# Patient Record
Sex: FEMALE | Race: WHITE | Employment: OTHER | URBAN - METROPOLITAN AREA
[De-identification: names, ages, dates, MRNs, and addresses within clinical notes are randomized per-mention and may not be internally consistent; named-entity substitution may affect disease eponyms.]

---

## 2017-05-07 ENCOUNTER — APPOINTMENT (OUTPATIENT)
Dept: GENERAL RADIOLOGY | Facility: HOSPITAL | Age: 62
End: 2017-05-07
Attending: EMERGENCY MEDICINE
Payer: COMMERCIAL

## 2017-05-07 ENCOUNTER — APPOINTMENT (OUTPATIENT)
Dept: GENERAL RADIOLOGY | Facility: HOSPITAL | Age: 62
End: 2017-05-07
Payer: COMMERCIAL

## 2017-05-07 ENCOUNTER — HOSPITAL ENCOUNTER (EMERGENCY)
Facility: HOSPITAL | Age: 62
Discharge: HOME | End: 2017-05-07
Attending: EMERGENCY MEDICINE
Payer: COMMERCIAL

## 2017-05-07 VITALS
HEIGHT: 66 IN | WEIGHT: 180 LBS | DIASTOLIC BLOOD PRESSURE: 63 MMHG | RESPIRATION RATE: 18 BRPM | HEART RATE: 75 BPM | BODY MASS INDEX: 28.93 KG/M2 | OXYGEN SATURATION: 98 % | SYSTOLIC BLOOD PRESSURE: 128 MMHG | TEMPERATURE: 98 F

## 2017-05-07 DIAGNOSIS — S20.211A CHEST WALL CONTUSION, RIGHT, INITIAL ENCOUNTER: ICD-10-CM

## 2017-05-07 DIAGNOSIS — S30.0XXA CONTUSION OF PELVIS, INITIAL ENCOUNTER: Primary | ICD-10-CM

## 2017-05-07 PROCEDURE — 71101 X-RAY EXAM UNILAT RIBS/CHEST: CPT | Performed by: EMERGENCY MEDICINE

## 2017-05-07 PROCEDURE — 99284 EMERGENCY DEPT VISIT MOD MDM: CPT

## 2017-05-07 PROCEDURE — 73502 X-RAY EXAM HIP UNI 2-3 VIEWS: CPT

## 2017-05-07 RX ORDER — IBUPROFEN 600 MG/1
600 TABLET ORAL ONCE
Status: COMPLETED | OUTPATIENT
Start: 2017-05-07 | End: 2017-05-07

## 2017-05-07 NOTE — ED INITIAL ASSESSMENT (HPI)
Pt reports trip/fall forward apptx 1 hour ago, landed on outstretched hands and left hip. Has abrasions to hands, pain to left hip.

## 2017-05-07 NOTE — ED PROVIDER NOTES
Patient Seen in: [facility] Emergency Department    History   Patient presents with:  Fall (musculoskeletal, neurologic)    Stated Complaint: fall, left hip pain    HPI    Patient presents with pain after fall.   She was walking downtown she trippe complaint: fall, left hip pain  Other systems are as noted in HPI. Constitutional and vital signs reviewed. All other systems reviewed and negative except as noted above.     PSFH elements reviewed from today and agreed except as otherwise stated in H want any prescriptions    ED Course   Labs Reviewed - No data to display     MDM     97% Normal       Disposition and Plan     Clinical Impression:  Contusion of pelvis, initial encounter  (primary encounter diagnosis)  Chest wall contusion, right, initial

## 2017-05-16 ENCOUNTER — OFFICE VISIT (OUTPATIENT)
Dept: FAMILY MEDICINE CLINIC | Facility: CLINIC | Age: 62
End: 2017-05-16

## 2017-05-16 VITALS
SYSTOLIC BLOOD PRESSURE: 119 MMHG | TEMPERATURE: 99 F | BODY MASS INDEX: 30.53 KG/M2 | WEIGHT: 190 LBS | HEIGHT: 66 IN | HEART RATE: 73 BPM | DIASTOLIC BLOOD PRESSURE: 70 MMHG

## 2017-05-16 DIAGNOSIS — M25.552 LEFT HIP PAIN: ICD-10-CM

## 2017-05-16 DIAGNOSIS — M79.18 PIRIFORMIS MUSCLE PAIN: ICD-10-CM

## 2017-05-16 DIAGNOSIS — M79.18 LEFT BUTTOCK PAIN: Primary | ICD-10-CM

## 2017-05-16 PROCEDURE — 99212 OFFICE O/P EST SF 10 MIN: CPT | Performed by: FAMILY MEDICINE

## 2017-05-16 PROCEDURE — 99214 OFFICE O/P EST MOD 30 MIN: CPT | Performed by: FAMILY MEDICINE

## 2017-05-16 NOTE — PROGRESS NOTES
Patient ID: Vladimir Nuñez is a 59year old female.       Patient Information      Patient Name Sex CHARLES Wen Female 6/3/1955       ED Provider Notes by Elizabeth Mackey MD at 2017  1:00 AM      Author: Elizabeth Mackey MD Ser Xr Hip W Or Wo Pelvis 2 Or 3 Views, Left (cpt=73502)    5/7/2017  PROCEDURE: XR HIP W OR WO PELVIS 2 OR 3 VIEWS, LEFT (CPT=73502)  COMPARISON: None. INDICATIONS: Left hip pain post fall today. TECHNIQUE: Three views were obtained.   FINDINGS:  BON tissue removal,endo frontal,endo total ethmoidectomy             Current Outpatient Prescriptions:  Fluticasone Propionate 50 MCG/ACT Nasal Suspension 2 sprays by Nasal route daily.  Disp: 1 Bottle Rfl: 11   acetaminophen 500 MG Oral Tab Take 500 mg by mout

## 2017-05-23 ENCOUNTER — OFFICE VISIT (OUTPATIENT)
Dept: PHYSICAL THERAPY | Age: 62
End: 2017-05-23
Attending: FAMILY MEDICINE
Payer: COMMERCIAL

## 2017-05-23 DIAGNOSIS — M79.18 PIRIFORMIS MUSCLE PAIN: ICD-10-CM

## 2017-05-23 DIAGNOSIS — M25.552 LEFT HIP PAIN: ICD-10-CM

## 2017-05-23 DIAGNOSIS — M79.18 LEFT BUTTOCK PAIN: Primary | ICD-10-CM

## 2017-05-23 PROCEDURE — 97162 PT EVAL MOD COMPLEX 30 MIN: CPT

## 2017-05-23 PROCEDURE — 97110 THERAPEUTIC EXERCISES: CPT

## 2017-05-23 PROCEDURE — 97530 THERAPEUTIC ACTIVITIES: CPT

## 2017-05-23 NOTE — PROGRESS NOTES
PHYSICAL THERAPY  EVALUATION:   Referring Physician: Dr. Alexis Humphries  Date of Onset: 5/7/2017 Date of Service: 5/23/2017   Dx: Left buttock pain (M79.1)  Piriformis muscle pain (M79.1)  Left hip pain (M25.552)  PATIENT SUMMARY:   Johnna Young is a 59 [employer] following injury, and she will continue to benefit from therapeutic neuroscience education for pain education.  She presents with movement dysfunction consistent with insufficient L hip flexion and extension with AGMR (anterior glide medial rotat dermatomes intact to light touch    Accessory Motion (remarkable findings): decreased L hip posterior glide    Special Tests:  - Hip Scouring Pain: R=(-), L =(+)  - KRISTI test: R = (-), L =(+)     Today’s Treatment and Response: pt with increased L hip fle care.    Thank you for your referral. Please co-sign or sign and return this letter via fax as soon as possible to [phone]. If you have any questions, please contact me at No information on file.     Sincerely,  Electronically signed by therapist: Vicenta Burns

## 2017-05-25 ENCOUNTER — OFFICE VISIT (OUTPATIENT)
Dept: PHYSICAL THERAPY | Age: 62
End: 2017-05-25
Attending: FAMILY MEDICINE
Payer: COMMERCIAL

## 2017-05-25 DIAGNOSIS — M79.18 LEFT BUTTOCK PAIN: Primary | ICD-10-CM

## 2017-05-25 DIAGNOSIS — M79.18 PIRIFORMIS MUSCLE PAIN: ICD-10-CM

## 2017-05-25 DIAGNOSIS — M25.552 LEFT HIP PAIN: ICD-10-CM

## 2017-05-25 PROCEDURE — 97140 MANUAL THERAPY 1/> REGIONS: CPT

## 2017-05-25 PROCEDURE — 97530 THERAPEUTIC ACTIVITIES: CPT

## 2017-05-25 PROCEDURE — 97110 THERAPEUTIC EXERCISES: CPT

## 2017-05-25 NOTE — PROGRESS NOTES
Dx: Left buttock pain (M79.1)  Piriformis muscle pain (M79.1)  Left hip pain (M25.552)         Authorized # of Visits:  2/9 - 8/21/2017        Next MD visit: none scheduled  Fall Risk: standard         Precautions: n/a           Medication Changes since la progressive strengthening as pt tolerates.     Charges: Merlyn Foley Act x1       Total Timed Treatment: 45 min  Total Treatment Time: 45 min

## 2017-05-30 ENCOUNTER — OFFICE VISIT (OUTPATIENT)
Dept: PHYSICAL THERAPY | Age: 62
End: 2017-05-30
Attending: FAMILY MEDICINE
Payer: COMMERCIAL

## 2017-05-30 DIAGNOSIS — M25.552 LEFT HIP PAIN: ICD-10-CM

## 2017-05-30 DIAGNOSIS — M79.18 PIRIFORMIS MUSCLE PAIN: ICD-10-CM

## 2017-05-30 DIAGNOSIS — M79.18 LEFT BUTTOCK PAIN: Primary | ICD-10-CM

## 2017-05-30 PROCEDURE — 97110 THERAPEUTIC EXERCISES: CPT

## 2017-05-30 PROCEDURE — 97140 MANUAL THERAPY 1/> REGIONS: CPT

## 2017-05-30 NOTE — PROGRESS NOTES
Dx: Left buttock pain (M79.1)  Piriformis muscle pain (M79.1)  Left hip pain (M25.552)         Authorized # of Visits:  2/9 - 8/21/2017        Next MD visit: none scheduled  Fall Risk: standard         Precautions: n/a           Medication Changes since la better ease with stair climbing at home. (IN PROGRESS - Down alternating; improving to ascend)  3. Patient will have L hip strength at 5/5 to be able to get up and down from the floor.  (IN PROGRESS)  4. Patient will perform all functional transfers ( sit t

## 2017-06-01 ENCOUNTER — OFFICE VISIT (OUTPATIENT)
Dept: PHYSICAL THERAPY | Age: 62
End: 2017-06-01
Attending: FAMILY MEDICINE
Payer: COMMERCIAL

## 2017-06-01 DIAGNOSIS — M79.18 PIRIFORMIS MUSCLE PAIN: ICD-10-CM

## 2017-06-01 DIAGNOSIS — M25.552 LEFT HIP PAIN: ICD-10-CM

## 2017-06-01 DIAGNOSIS — M79.18 LEFT BUTTOCK PAIN: Primary | ICD-10-CM

## 2017-06-01 PROCEDURE — 97140 MANUAL THERAPY 1/> REGIONS: CPT

## 2017-06-01 PROCEDURE — 97110 THERAPEUTIC EXERCISES: CPT

## 2017-06-01 NOTE — PROGRESS NOTES
Dx: Left buttock pain (M79.1)  Piriformis muscle pain (M79.1)  Left hip pain (M25.552)         Authorized # of Visits:  2/9 - 8/21/2017        Next MD visit: none scheduled  Fall Risk: standard         Precautions: n/a           Medication Changes since la minutes  - 1/2 kneel floor to/from stand transfer (demo and pt return demo; 1 hand support at mat table)  - Sit to stand transfer; anterior weight shift; staggered feet               Assessment: L hip flexion is steadily improving; however, she shows poor

## 2017-06-08 ENCOUNTER — OFFICE VISIT (OUTPATIENT)
Dept: PHYSICAL THERAPY | Age: 62
End: 2017-06-08
Attending: FAMILY MEDICINE
Payer: COMMERCIAL

## 2017-06-08 DIAGNOSIS — M79.18 PIRIFORMIS MUSCLE PAIN: ICD-10-CM

## 2017-06-08 DIAGNOSIS — M79.18 LEFT BUTTOCK PAIN: Primary | ICD-10-CM

## 2017-06-08 DIAGNOSIS — M25.552 LEFT HIP PAIN: ICD-10-CM

## 2017-06-08 PROCEDURE — 97140 MANUAL THERAPY 1/> REGIONS: CPT

## 2017-06-08 PROCEDURE — 97110 THERAPEUTIC EXERCISES: CPT

## 2017-06-08 NOTE — PROGRESS NOTES
Dx: Left buttock pain (M79.1)  Piriformis muscle pain (M79.1)  Left hip pain (M25.552)         Authorized # of Visits:  2/9 - 8/21/2017        Next MD visit: none scheduled  Fall Risk: standard         Precautions: n/a           Medication Changes since la Sidelying: Lumbar sacral distraction X 15 minutes  - Supine: MFR at L iliopsoas; L hip adductors  - Supine: posterior mob of L femoral head; PROM for hip flex/ext  - Supine: Diaphragm  MFR X 15 minutes  - Supine: MFR at L iliopsoas; L hip adductors  - Supi

## 2017-06-09 ENCOUNTER — TELEPHONE (OUTPATIENT)
Dept: PHYSICAL THERAPY | Age: 62
End: 2017-06-09

## 2017-06-13 ENCOUNTER — APPOINTMENT (OUTPATIENT)
Dept: PHYSICAL THERAPY | Age: 62
End: 2017-06-13
Attending: FAMILY MEDICINE
Payer: COMMERCIAL

## 2017-06-20 ENCOUNTER — APPOINTMENT (OUTPATIENT)
Dept: PHYSICAL THERAPY | Age: 62
End: 2017-06-20
Attending: FAMILY MEDICINE
Payer: COMMERCIAL

## 2017-06-22 ENCOUNTER — OFFICE VISIT (OUTPATIENT)
Dept: PHYSICAL THERAPY | Age: 62
End: 2017-06-22
Attending: FAMILY MEDICINE
Payer: COMMERCIAL

## 2017-06-22 DIAGNOSIS — M25.552 LEFT HIP PAIN: ICD-10-CM

## 2017-06-22 DIAGNOSIS — M79.18 PIRIFORMIS MUSCLE PAIN: ICD-10-CM

## 2017-06-22 DIAGNOSIS — M79.18 LEFT BUTTOCK PAIN: Primary | ICD-10-CM

## 2017-06-22 PROCEDURE — 97110 THERAPEUTIC EXERCISES: CPT

## 2017-06-22 PROCEDURE — 97140 MANUAL THERAPY 1/> REGIONS: CPT

## 2017-06-22 NOTE — PROGRESS NOTES
Dx: Left buttock pain (M79.1)  Piriformis muscle pain (M79.1)  Left hip pain (M25.552)         Authorized # of Visits:  6/9 - 8/21/2017        Next MD visit: none scheduled  Fall Risk: standard         Precautions: n/a           Medication Changes since la Hip Ext  R=WFL, L=Does not extend to neutral R=WFL, L = mild loss (stiffness)      Palpation: mild tenderness to palpation at L iliopsoas; proximal hip adductors; TFL    Neuro Screen: B LE dermatomes intact to light touch    Accessory Motion (remarkable 10x2, B  - Supine: SLR; 10x2  - standing squats (\"sitbacks\"); 10x2  - Sidelying: benigno PGM  - Standing: forward lunges; 10xB  - standing: staggered squat; 10x1   Manual Therapy X 8 minutes  - Supine: MFR at L iliopsoas; L hip adductors  - Supine: po stiffness)  3. Patient will have L hip strength at 5/5 to be able to get up and down from the floor.  (IN PROGRESS)  4. Patient will perform all functional transfers ( sit to/from stand and car transfers) with good body mechanics and without provocation of

## 2017-06-27 ENCOUNTER — APPOINTMENT (OUTPATIENT)
Dept: PHYSICAL THERAPY | Age: 62
End: 2017-06-27
Payer: COMMERCIAL

## 2017-06-29 ENCOUNTER — OFFICE VISIT (OUTPATIENT)
Dept: PHYSICAL THERAPY | Age: 62
End: 2017-06-29
Attending: FAMILY MEDICINE
Payer: COMMERCIAL

## 2017-06-29 PROCEDURE — 97110 THERAPEUTIC EXERCISES: CPT

## 2017-06-29 PROCEDURE — 97530 THERAPEUTIC ACTIVITIES: CPT

## 2017-06-29 NOTE — PROGRESS NOTES
Patient Name: Luciana Sullivan  YOB: 1955          MRN number:  [id number]  Date:  6/30/2017  Referring Physician:  Elizabeth Back  Dx:  Left buttock pain (M79.1)  Piriformis muscle pain (M79.1)  Left hip pain (M25.552)         Authorized # of V L=2+/5   R=4+, L=4/5   Knee Extension  R=5/5, L=5/5   R=5/5, L=5/5   Knee Flexion  R=5/5, L=5/5   R=5/5, L=5/5   Ankle DF  R=5/5, L=5/5   R=5/5, L=5/5   Hip IR NT  R= 4-/5, L =4/5   Hip ER NT R= 4/5, L= 4-/5       LE ROM:    5/23/2017 6/22/2017    Hip Fle required: NO      Treatment Rendered   6/29/2017  Tx#: 7/9   HEP - Reviewed HEP and body mechanics for exercise at [facility]  - added sciatic n. neuromobilization   Therapeutic Exercise X 30 minutes  -objective measures taken, see above  - bridges; 10x1

## 2017-09-12 ENCOUNTER — TELEPHONE (OUTPATIENT)
Dept: FAMILY MEDICINE CLINIC | Facility: CLINIC | Age: 62
End: 2017-09-12

## 2017-09-12 DIAGNOSIS — Z12.31 SCREENING MAMMOGRAM, ENCOUNTER FOR: Primary | ICD-10-CM

## 2017-10-05 ENCOUNTER — HOSPITAL ENCOUNTER (OUTPATIENT)
Dept: GENERAL RADIOLOGY | Age: 62
Discharge: HOME | End: 2017-10-05
Attending: FAMILY MEDICINE | Admitting: FAMILY MEDICINE
Payer: COMMERCIAL

## 2017-10-05 ENCOUNTER — OFFICE VISIT (OUTPATIENT)
Dept: FAMILY MEDICINE CLINIC | Facility: CLINIC | Age: 62
End: 2017-10-05

## 2017-10-05 VITALS
TEMPERATURE: 97 F | HEIGHT: 66 IN | HEART RATE: 57 BPM | BODY MASS INDEX: 30.37 KG/M2 | WEIGHT: 189 LBS | SYSTOLIC BLOOD PRESSURE: 129 MMHG | DIASTOLIC BLOOD PRESSURE: 72 MMHG

## 2017-10-05 DIAGNOSIS — Z00.00 ADULT GENERAL MEDICAL EXAM: Primary | ICD-10-CM

## 2017-10-05 DIAGNOSIS — E66.09 NON MORBID OBESITY DUE TO EXCESS CALORIES: ICD-10-CM

## 2017-10-05 DIAGNOSIS — J30.89 OTHER ALLERGIC RHINITIS: ICD-10-CM

## 2017-10-05 DIAGNOSIS — R68.89 CHRONIC THROAT CLEARING: ICD-10-CM

## 2017-10-05 DIAGNOSIS — Z23 NEED FOR VACCINATION: ICD-10-CM

## 2017-10-05 DIAGNOSIS — Z12.4 CERVICAL CANCER SCREENING: ICD-10-CM

## 2017-10-05 DIAGNOSIS — Z12.11 SCREENING FOR COLON CANCER: ICD-10-CM

## 2017-10-05 PROCEDURE — 99396 PREV VISIT EST AGE 40-64: CPT | Performed by: FAMILY MEDICINE

## 2017-10-05 PROCEDURE — 99212 OFFICE O/P EST SF 10 MIN: CPT | Performed by: FAMILY MEDICINE

## 2017-10-05 PROCEDURE — 71020 XR CHEST PA + LAT CHEST (CPT=71020): CPT | Performed by: FAMILY MEDICINE

## 2017-10-05 PROCEDURE — 90715 TDAP VACCINE 7 YRS/> IM: CPT | Performed by: FAMILY MEDICINE

## 2017-10-05 PROCEDURE — 90471 IMMUNIZATION ADMIN: CPT | Performed by: FAMILY MEDICINE

## 2017-10-05 RX ORDER — MONTELUKAST SODIUM 10 MG/1
10 TABLET ORAL DAILY
Qty: 90 TABLET | Refills: 1 | Status: SHIPPED | OUTPATIENT
Start: 2017-10-05 | End: 2018-04-03

## 2017-10-05 NOTE — PROGRESS NOTES
Patient ID: Caty Terry is a 57year old female. HPI  Patient presents with:  Routine Physical  She is here for physical exam.  She has an order for mammogram from September 2017 but states she has not made the appointment yet.   She has never had speech difficulty and light-headedness. Hematological: Negative for adenopathy. Does not bruise/bleed easily. Psychiatric/Behavioral: Negative for dysphoric mood and hallucinations. The patient is not nervous/anxious.           Past Medical History:   D oriented to person, place, and time. Skin: Skin is warm. Psychiatric: has a normal mood and affect. Vitals reviewed.       Blood pressure 129/72, pulse 57, temperature (!) 97.1 °F (36.2 °C), temperature source Oral, height 5' 6\" (1.676 m), weight 189 Dipropionate (QNASL) 80 MCG/ACT Nasal Aero Soln; 2 sprays by Nasal route daily. Flonase is not helping. Less to Qnasl instead and start Singulair at bedtime  Chronic throat clearing  -     Montelukast Sodium (SINGULAIR) 10 MG Oral Tab;  Take 1 tablet (10

## 2018-10-15 ENCOUNTER — OFFICE VISIT (OUTPATIENT)
Dept: OTOLARYNGOLOGY | Facility: CLINIC | Age: 63
End: 2018-10-15
Payer: COMMERCIAL

## 2018-10-15 VITALS
BODY MASS INDEX: 29.41 KG/M2 | SYSTOLIC BLOOD PRESSURE: 120 MMHG | HEIGHT: 63 IN | DIASTOLIC BLOOD PRESSURE: 70 MMHG | WEIGHT: 166 LBS | TEMPERATURE: 99 F

## 2018-10-15 DIAGNOSIS — J32.4 CHRONIC PANSINUSITIS: Primary | ICD-10-CM

## 2018-10-15 PROCEDURE — 99214 OFFICE O/P EST MOD 30 MIN: CPT | Performed by: OTOLARYNGOLOGY

## 2018-10-15 PROCEDURE — 99212 OFFICE O/P EST SF 10 MIN: CPT | Performed by: OTOLARYNGOLOGY

## 2018-10-15 RX ORDER — AMOXICILLIN 500 MG/1
500 TABLET, FILM COATED ORAL 3 TIMES DAILY
Qty: 30 TABLET | Refills: 1 | Status: SHIPPED | OUTPATIENT
Start: 2018-10-15 | End: 2018-10-25

## 2018-10-15 NOTE — PROGRESS NOTES
Fredrick Smith is a 70year old female. Patient presents with:  Nose Problem: pt reports nasal congestion, drainage, sinus pressure, headaches, coughing      HISTORY OF PRESENT ILLNESS  3/2/16 She presents for right-sided ear pressure.   She was recentl that her nose is producing can actually drain out. She has tried several different elimination diets and feels that when she does alter her diet her symptoms could greatly improved.   She finds that some of the triggers might be season changes humidity and Problem Relation Age of Onset   • Diabetes Other         Family h/o Diabetes   • Hypertension Other         Family h/o Hypertension   • Arthritis Other         Family h/o Arthritis   • Dementia Father         Alzheimer's Disease; age 80 (cause of death) Nasopharynx Normal External nose - Normal. Lips/teeth/gums - Normal. Tonsils - Normal. Oropharynx - Normal.   Ears  Normal Inspection - Right: Normal, Left: Normal. Canal - Right: Normal, Left: Normal. TM nl au   Skin Normal Inspection - Normal.

## 2020-09-29 ENCOUNTER — OFFICE VISIT (OUTPATIENT)
Dept: FAMILY MEDICINE CLINIC | Facility: CLINIC | Age: 65
End: 2020-09-29
Payer: COMMERCIAL

## 2020-09-29 ENCOUNTER — HOSPITAL ENCOUNTER (OUTPATIENT)
Dept: GENERAL RADIOLOGY | Age: 65
Discharge: HOME | End: 2020-09-29
Attending: FAMILY MEDICINE
Payer: COMMERCIAL

## 2020-09-29 ENCOUNTER — LAB ENCOUNTER (OUTPATIENT)
Dept: LAB | Age: 65
End: 2020-09-29
Attending: FAMILY MEDICINE
Payer: COMMERCIAL

## 2020-09-29 VITALS
TEMPERATURE: 97 F | SYSTOLIC BLOOD PRESSURE: 124 MMHG | BODY MASS INDEX: 31.86 KG/M2 | HEIGHT: 63 IN | WEIGHT: 179.81 LBS | DIASTOLIC BLOOD PRESSURE: 77 MMHG | HEART RATE: 72 BPM

## 2020-09-29 DIAGNOSIS — R09.89 BILATERAL CAROTID BRUITS: ICD-10-CM

## 2020-09-29 DIAGNOSIS — Z11.59 NEED FOR HEPATITIS C SCREENING TEST: ICD-10-CM

## 2020-09-29 DIAGNOSIS — Z00.00 ADULT GENERAL MEDICAL EXAM: Primary | ICD-10-CM

## 2020-09-29 DIAGNOSIS — I35.8 AORTIC HEART MURMUR ON EXAMINATION: ICD-10-CM

## 2020-09-29 DIAGNOSIS — Z12.31 VISIT FOR SCREENING MAMMOGRAM: ICD-10-CM

## 2020-09-29 DIAGNOSIS — M76.30 ILIOTIBIAL BAND SYNDROME, UNSPECIFIED LATERALITY: ICD-10-CM

## 2020-09-29 DIAGNOSIS — Z12.4 CERVICAL CANCER SCREENING: ICD-10-CM

## 2020-09-29 DIAGNOSIS — Z78.0 MENOPAUSE: ICD-10-CM

## 2020-09-29 DIAGNOSIS — Z23 NEED FOR VACCINATION: ICD-10-CM

## 2020-09-29 DIAGNOSIS — Z12.11 SCREENING FOR COLON CANCER: ICD-10-CM

## 2020-09-29 DIAGNOSIS — E66.09 NON MORBID OBESITY DUE TO EXCESS CALORIES: ICD-10-CM

## 2020-09-29 DIAGNOSIS — G57.03 PIRIFORMIS SYNDROME OF BOTH SIDES: ICD-10-CM

## 2020-09-29 DIAGNOSIS — J30.89 OTHER ALLERGIC RHINITIS: ICD-10-CM

## 2020-09-29 PROCEDURE — 3008F BODY MASS INDEX DOCD: CPT | Performed by: FAMILY MEDICINE

## 2020-09-29 PROCEDURE — 71046 X-RAY EXAM CHEST 2 VIEWS: CPT | Performed by: FAMILY MEDICINE

## 2020-09-29 PROCEDURE — 93005 ELECTROCARDIOGRAM TRACING: CPT

## 2020-09-29 PROCEDURE — 3074F SYST BP LT 130 MM HG: CPT | Performed by: FAMILY MEDICINE

## 2020-09-29 PROCEDURE — 99214 OFFICE O/P EST MOD 30 MIN: CPT | Performed by: FAMILY MEDICINE

## 2020-09-29 PROCEDURE — 99397 PER PM REEVAL EST PAT 65+ YR: CPT | Performed by: FAMILY MEDICINE

## 2020-09-29 PROCEDURE — 90471 IMMUNIZATION ADMIN: CPT | Performed by: FAMILY MEDICINE

## 2020-09-29 PROCEDURE — 90670 PCV13 VACCINE IM: CPT | Performed by: FAMILY MEDICINE

## 2020-09-29 PROCEDURE — 93010 ELECTROCARDIOGRAM REPORT: CPT | Performed by: FAMILY MEDICINE

## 2020-09-29 PROCEDURE — 99212 OFFICE O/P EST SF 10 MIN: CPT | Performed by: FAMILY MEDICINE

## 2020-09-29 PROCEDURE — 3078F DIAST BP <80 MM HG: CPT | Performed by: FAMILY MEDICINE

## 2020-09-29 NOTE — PROGRESS NOTES
Patient ID: Austen Nicholas is a 77year old female. HPI  Patient presents with:  Physical    Pt presents for a physical but would also like to be seen for left hip pain as well as bilateral thigh pain. Last physical on 10/5/2017.      Pt is [employer] 295 06/01/2016     06/01/2016    K 4.0 06/01/2016     06/01/2016    CO2 31 06/01/2016       Lab Results   Component Value Date     (H) 06/01/2016    BUN 13 06/01/2016    CREATSERUM 0.94 06/01/2016    BUNCREA 13.8 06/01/2016    ANIONGAP 9 frontal,endo total ethmoidectomy    • TONSILLECTOMY         Social History    Socioeconomic History      Marital status:       Spouse name: Not on file      Number of children: Not on file      Years of education: Not on file      Highest education Seat Belt: Not Asked        Self-Exams: Not Asked    Social History Narrative      Not on file         No current outpatient medications on file.      Allergies:No Known Allergies   PHYSICAL EXAM:   Physical Exam    Physical Exam   Constitutional: Patient 179 lb 12.8 oz (81.6 kg), not currently breastfeeding.          ASSESSMENT/PLAN:     REFUSES FLU SHOT    Diagnoses and all orders for this visit:    Adult general medical exam  -     CBC WITH DIFFERENTIAL WITH PLATELET  -     COMP METABOLIC PANEL (14)  - Comments:              Gastroenterology Department phone number is [phone].  ++++ Ask for first available provider that can see you ++++.           Referral Priority:Routine          Referral Type:OFFICE VISIT          Requested Specialty:GASTROENTEROL

## 2020-09-30 ENCOUNTER — HOSPITAL ENCOUNTER (OUTPATIENT)
Dept: ULTRASOUND IMAGING | Facility: HOSPITAL | Age: 65
Discharge: HOME | End: 2020-09-30
Attending: FAMILY MEDICINE
Payer: COMMERCIAL

## 2020-09-30 DIAGNOSIS — R09.89 BILATERAL CAROTID BRUITS: ICD-10-CM

## 2020-09-30 PROCEDURE — 93880 EXTRACRANIAL BILAT STUDY: CPT | Performed by: FAMILY MEDICINE

## 2020-10-01 ENCOUNTER — HOSPITAL ENCOUNTER (OUTPATIENT)
Dept: CV DIAGNOSTICS | Facility: HOSPITAL | Age: 65
Discharge: HOME | End: 2020-10-01
Attending: FAMILY MEDICINE
Payer: COMMERCIAL

## 2020-10-01 DIAGNOSIS — I35.8 AORTIC HEART MURMUR ON EXAMINATION: ICD-10-CM

## 2020-10-01 PROCEDURE — 93306 TTE W/DOPPLER COMPLETE: CPT | Performed by: FAMILY MEDICINE

## 2020-10-23 ENCOUNTER — HOSPITAL ENCOUNTER (OUTPATIENT)
Dept: BONE DENSITY | Facility: HOSPITAL | Age: 65
Discharge: HOME | End: 2020-10-23
Attending: FAMILY MEDICINE
Payer: COMMERCIAL

## 2020-10-23 DIAGNOSIS — Z78.0 MENOPAUSE: ICD-10-CM

## 2020-10-23 PROCEDURE — 77080 DXA BONE DENSITY AXIAL: CPT | Performed by: FAMILY MEDICINE

## 2020-10-26 ENCOUNTER — HOSPITAL ENCOUNTER (OUTPATIENT)
Dept: MAMMOGRAPHY | Facility: HOSPITAL | Age: 65
Discharge: HOME | End: 2020-10-26
Attending: FAMILY MEDICINE
Payer: COMMERCIAL

## 2020-10-26 DIAGNOSIS — Z12.31 VISIT FOR SCREENING MAMMOGRAM: ICD-10-CM

## 2020-10-26 PROCEDURE — 77063 BREAST TOMOSYNTHESIS BI: CPT | Performed by: FAMILY MEDICINE

## 2020-10-26 PROCEDURE — 77067 SCR MAMMO BI INCL CAD: CPT | Performed by: FAMILY MEDICINE

## 2020-10-29 ENCOUNTER — PATIENT MESSAGE (OUTPATIENT)
Dept: FAMILY MEDICINE CLINIC | Facility: CLINIC | Age: 65
End: 2020-10-29

## 2020-10-30 NOTE — TELEPHONE ENCOUNTER
To: Adalgisa Hermosillo      From: Rabia Thomas RN      Created: 10/30/2020 7:55 AM        Sangeetha Lofton,  I apologize for the phone #. See below for the referral information and phone #.  Thank you, Shauna NGUYEN     Referral Information:  Order Date: Oct 1, 2020

## 2020-10-30 NOTE — TELEPHONE ENCOUNTER
From: Marilyn Govea  To: Stephane Silver DO  Sent: 10/29/2020 4:09 PM CDT  Subject: Non-Urgent Medical Question    In notes from echocardiogram you noted a referral to see a cardiologist so they can evaluate me.  The cardiology department phone number (3

## 2020-11-17 ENCOUNTER — MED REC SCAN ONLY (OUTPATIENT)
Dept: FAMILY MEDICINE CLINIC | Facility: CLINIC | Age: 65
End: 2020-11-17

## 2020-12-15 ENCOUNTER — OFFICE VISIT (OUTPATIENT)
Dept: FAMILY MEDICINE CLINIC | Facility: CLINIC | Age: 65
End: 2020-12-15
Payer: COMMERCIAL

## 2020-12-15 ENCOUNTER — HOSPITAL ENCOUNTER (OUTPATIENT)
Dept: ULTRASOUND IMAGING | Facility: HOSPITAL | Age: 65
Discharge: HOME | End: 2020-12-15
Attending: FAMILY MEDICINE
Payer: COMMERCIAL

## 2020-12-15 ENCOUNTER — NURSE TRIAGE (OUTPATIENT)
Dept: FAMILY MEDICINE CLINIC | Facility: CLINIC | Age: 65
End: 2020-12-15

## 2020-12-15 VITALS
BODY MASS INDEX: 32.6 KG/M2 | DIASTOLIC BLOOD PRESSURE: 73 MMHG | WEIGHT: 184 LBS | HEART RATE: 56 BPM | SYSTOLIC BLOOD PRESSURE: 124 MMHG | HEIGHT: 63 IN

## 2020-12-15 DIAGNOSIS — M25.562 ACUTE PAIN OF LEFT KNEE: ICD-10-CM

## 2020-12-15 DIAGNOSIS — M79.662 PAIN OF LEFT LOWER LEG: ICD-10-CM

## 2020-12-15 DIAGNOSIS — M79.662 PAIN OF LEFT LOWER LEG: Primary | ICD-10-CM

## 2020-12-15 PROCEDURE — 99212 OFFICE O/P EST SF 10 MIN: CPT | Performed by: FAMILY MEDICINE

## 2020-12-15 PROCEDURE — 3074F SYST BP LT 130 MM HG: CPT | Performed by: FAMILY MEDICINE

## 2020-12-15 PROCEDURE — 93971 EXTREMITY STUDY: CPT | Performed by: FAMILY MEDICINE

## 2020-12-15 PROCEDURE — 3008F BODY MASS INDEX DOCD: CPT | Performed by: FAMILY MEDICINE

## 2020-12-15 PROCEDURE — 99214 OFFICE O/P EST MOD 30 MIN: CPT | Performed by: FAMILY MEDICINE

## 2020-12-15 PROCEDURE — 3078F DIAST BP <80 MM HG: CPT | Performed by: FAMILY MEDICINE

## 2020-12-15 NOTE — TELEPHONE ENCOUNTER
----- Message from Ayala Tobias sent at 12/15/2020  8:35 AM CST -----  Regarding: RE: Visit Follow-up Question  Contact: [phone]  The condition in my left leg has gotten worse - I would like to see a specialist.  The PT I have received has not cleare

## 2020-12-15 NOTE — PROGRESS NOTES
HPI:    Patient ID: Fay Kirkland is a 77year old female. HPI  Pt has been experiencing left knee pain. Worse with stairs. Currently involved in PT. Now feels pain in left lower leg and calf. No injury. Some swelling.    Review of Systems   Cons

## 2020-12-16 ENCOUNTER — PATIENT MESSAGE (OUTPATIENT)
Dept: FAMILY MEDICINE CLINIC | Facility: CLINIC | Age: 65
End: 2020-12-16

## 2020-12-16 DIAGNOSIS — M79.605 LEFT LEG PAIN: Primary | ICD-10-CM

## 2020-12-16 NOTE — TELEPHONE ENCOUNTER
From: Harvinder Sauceda  To: Jeremias Wetzel DO  Sent: 12/16/2020 10:30 AM CST  Subject: Test Results Question    Since the ultrasound on my leg came back negative will you be requesting an x-ray of my knees today?  Tosha Avila

## 2020-12-17 NOTE — TELEPHONE ENCOUNTER
Verified name and . Patient following up with [employer] message as seen below. Please advise. Thank you.

## 2020-12-18 ENCOUNTER — TELEPHONE (OUTPATIENT)
Dept: FAMILY MEDICINE CLINIC | Facility: CLINIC | Age: 65
End: 2020-12-18

## 2020-12-18 NOTE — TELEPHONE ENCOUNTER
Maggy Santana  to Kelvin Julio, DO          10:30 AM  Since the ultrasound on my leg came back negative will you be requesting an x-ray of my knees today?   Thank Angelita Paniagua

## 2020-12-18 NOTE — TELEPHONE ENCOUNTER
Patient was seen in office visit 12/15/20 with pain of the left lower leg. Patient sent [employer] message below. Patient asking what the next step is. She is still having left leg pain radiating to calf, difficulty walking and still wearing leg brace.   Ple

## 2020-12-21 ENCOUNTER — HOSPITAL ENCOUNTER (OUTPATIENT)
Dept: GENERAL RADIOLOGY | Age: 65
Discharge: HOME | End: 2020-12-21
Attending: FAMILY MEDICINE
Payer: COMMERCIAL

## 2020-12-21 DIAGNOSIS — M79.605 LEFT LEG PAIN: ICD-10-CM

## 2020-12-21 PROCEDURE — 73562 X-RAY EXAM OF KNEE 3: CPT | Performed by: FAMILY MEDICINE

## 2021-01-11 ENCOUNTER — OFFICE VISIT (OUTPATIENT)
Dept: OBGYN CLINIC | Facility: CLINIC | Age: 66
End: 2021-01-11
Payer: COMMERCIAL

## 2021-01-11 VITALS
HEART RATE: 61 BPM | BODY MASS INDEX: 33 KG/M2 | DIASTOLIC BLOOD PRESSURE: 79 MMHG | SYSTOLIC BLOOD PRESSURE: 145 MMHG | WEIGHT: 184.63 LBS

## 2021-01-11 DIAGNOSIS — Z01.419 ENCOUNTER FOR GYNECOLOGICAL EXAMINATION WITHOUT ABNORMAL FINDING: Primary | ICD-10-CM

## 2021-01-11 PROCEDURE — 3078F DIAST BP <80 MM HG: CPT | Performed by: OBSTETRICS & GYNECOLOGY

## 2021-01-11 PROCEDURE — 3077F SYST BP >= 140 MM HG: CPT | Performed by: OBSTETRICS & GYNECOLOGY

## 2021-01-11 PROCEDURE — 99387 INIT PM E/M NEW PAT 65+ YRS: CPT | Performed by: OBSTETRICS & GYNECOLOGY

## 2021-01-11 NOTE — PROGRESS NOTES
Lorin Gtz is a 77year old female  No LMP recorded. Patient is postmenopausal. Patient presents with:  Gyn Exam: ANNUAL EXAM -- new pt. Last pap over 10 years ago. No issues  .     OBSTETRICS HISTORY:  OB History    Para Term  A on file        Minutes per session: Not on file      Stress: Not on file    Relationships      Social connections        Talks on phone: Not on file        Gets together: Not on file        Attends [employer] service: Not on file        Active member of clu pain, lumps, or discharge  Neurological:    denies frequent severe headaches  Psychiatric:   denies depression or anxiety, thoughts of harming self or others  Heme/Lymph:    denies easy bruising or bleeding      PHYSICAL EXAM:   Blood pressure 145/79, puls

## 2021-01-12 ENCOUNTER — OFFICE VISIT (OUTPATIENT)
Dept: ORTHOPEDICS CLINIC | Facility: CLINIC | Age: 66
End: 2021-01-12
Payer: COMMERCIAL

## 2021-01-12 ENCOUNTER — HOSPITAL ENCOUNTER (OUTPATIENT)
Dept: GENERAL RADIOLOGY | Facility: HOSPITAL | Age: 66
Discharge: HOME | End: 2021-01-12
Attending: ORTHOPAEDIC SURGERY
Payer: COMMERCIAL

## 2021-01-12 VITALS — WEIGHT: 184 LBS | BODY MASS INDEX: 32.6 KG/M2 | HEIGHT: 63 IN

## 2021-01-12 DIAGNOSIS — M25.561 RIGHT KNEE PAIN, UNSPECIFIED CHRONICITY: ICD-10-CM

## 2021-01-12 DIAGNOSIS — M17.11 PRIMARY OSTEOARTHRITIS OF RIGHT KNEE: Primary | ICD-10-CM

## 2021-01-12 DIAGNOSIS — G57.02 PIRIFORMIS SYNDROME OF LEFT SIDE: ICD-10-CM

## 2021-01-12 DIAGNOSIS — M70.52 PES ANSERINUS BURSITIS OF LEFT KNEE: ICD-10-CM

## 2021-01-12 PROCEDURE — 3008F BODY MASS INDEX DOCD: CPT | Performed by: ORTHOPAEDIC SURGERY

## 2021-01-12 PROCEDURE — 73564 X-RAY EXAM KNEE 4 OR MORE: CPT | Performed by: ORTHOPAEDIC SURGERY

## 2021-01-12 PROCEDURE — 99203 OFFICE O/P NEW LOW 30 MIN: CPT | Performed by: ORTHOPAEDIC SURGERY

## 2021-01-12 NOTE — H&P
NURSING INTAKE COMMENTS: Patient presents with:  Knee Pain: swelling and pain on left, also pain on right, difficulty with stairs  Hip Pain: s/p PT      HPI: This 77year old female presents today with complaints of intermittent pain in both knees as well Smoker      Smokeless tobacco: Never Used    Substance and Sexual Activity      Alcohol use:  Yes        Alcohol/week: 5.0 standard drinks        Types: 5 Glasses of wine per week        Comment: beer, weekly      Drug use: No      Sexual activity: Not on f Knee Routine (3 Views), Left (cpt=73562)    Result Date: 12/21/2020  PROCEDURE: XR KNEE ROUTINE (3 VIEWS), LEFT (CPT=73562)  COMPARISON: None. INDICATIONS: Left anterior knee pain x 1 week .  No injury  TECHNIQUE: 3 views were obtained with weightbearing t Component Value Date    GLU 89 10/01/2020    BUN 19 10/01/2020    CREATSERUM 0.92 10/01/2020    GFRNAA 64 10/01/2020    GFRAA 75 10/01/2020        Assessment and Plan:  Diagnoses and all orders for this visit:    Primary osteoarthritis of right knee    R

## 2021-01-13 LAB — HPV I/H RISK 1 DNA SPEC QL NAA+PROBE: NEGATIVE

## 2021-01-22 ENCOUNTER — HOSPITAL ENCOUNTER (OUTPATIENT)
Dept: MRI IMAGING | Facility: HOSPITAL | Age: 66
Discharge: HOME | End: 2021-01-22
Attending: ORTHOPAEDIC SURGERY
Payer: COMMERCIAL

## 2021-01-22 DIAGNOSIS — M25.561 RIGHT KNEE PAIN, UNSPECIFIED CHRONICITY: ICD-10-CM

## 2021-01-22 PROCEDURE — 73721 MRI JNT OF LWR EXTRE W/O DYE: CPT | Performed by: ORTHOPAEDIC SURGERY

## 2021-01-29 ENCOUNTER — OFFICE VISIT (OUTPATIENT)
Dept: ORTHOPEDICS CLINIC | Facility: CLINIC | Age: 66
End: 2021-01-29
Payer: COMMERCIAL

## 2021-01-29 DIAGNOSIS — M23.200 OLD COMPLEX TEAR OF LATERAL MENISCUS OF RIGHT KNEE: ICD-10-CM

## 2021-01-29 DIAGNOSIS — M17.11 PRIMARY OSTEOARTHRITIS OF RIGHT KNEE: Primary | ICD-10-CM

## 2021-01-29 PROCEDURE — 99212 OFFICE O/P EST SF 10 MIN: CPT | Performed by: ORTHOPAEDIC SURGERY

## 2021-01-30 NOTE — PROGRESS NOTES
NURSING INTAKE COMMENTS: Patient presents with:  Knee Pain: Right f/u and MRI results - states the knee is about the same - has no pain but has stifness in the knee       HPI: This 77year old female presents today with complaints of right knee pain.   The Right knee exam demonstrates no tenderness palpation about the knee. Knee range of motion is from 0 to 120 degrees with further flexion limited by pain. Knee is ligamentously stable.     Imaging: Xr Knee, Complete (4 Or More Views), Right (cpt=73564)    R maceration. ARTICULAR CARTILAGE: Full-thickness articular cartilage defect over the femoral condyle measuring about 2.5 cm and over the posterolateral tibial plateau measuring about 2 cm. Other regions of moderate irregular articular cartilage thinning . 10/01/2020     10/01/2020      Lab Results   Component Value Date    GLU 89 10/01/2020    BUN 19 10/01/2020    CREATSERUM 0.92 10/01/2020    GFRNAA 64 10/01/2020    GFRAA 75 10/01/2020        Assessment and Plan:  Diagnoses and all orders for this v

## 2021-02-18 ENCOUNTER — OFFICE VISIT (OUTPATIENT)
Dept: CARDIOLOGY CLINIC | Facility: CLINIC | Age: 66
End: 2021-02-18
Payer: COMMERCIAL

## 2021-02-18 VITALS
SYSTOLIC BLOOD PRESSURE: 147 MMHG | HEIGHT: 63 IN | HEART RATE: 60 BPM | WEIGHT: 188 LBS | DIASTOLIC BLOOD PRESSURE: 84 MMHG | BODY MASS INDEX: 33.31 KG/M2

## 2021-02-18 DIAGNOSIS — I34.0 MITRAL VALVE INSUFFICIENCY, UNSPECIFIED ETIOLOGY: ICD-10-CM

## 2021-02-18 DIAGNOSIS — I07.1 TRICUSPID VALVE INSUFFICIENCY, UNSPECIFIED ETIOLOGY: ICD-10-CM

## 2021-02-18 DIAGNOSIS — R03.0 ELEVATED BLOOD-PRESSURE READING WITHOUT DIAGNOSIS OF HYPERTENSION: ICD-10-CM

## 2021-02-18 DIAGNOSIS — I35.1 AORTIC VALVE INSUFFICIENCY, ETIOLOGY OF CARDIAC VALVE DISEASE UNSPECIFIED: Primary | ICD-10-CM

## 2021-02-18 PROCEDURE — 93000 ELECTROCARDIOGRAM COMPLETE: CPT | Performed by: INTERNAL MEDICINE

## 2021-02-18 PROCEDURE — 99205 OFFICE O/P NEW HI 60 MIN: CPT | Performed by: INTERNAL MEDICINE

## 2021-02-18 PROCEDURE — 3077F SYST BP >= 140 MM HG: CPT | Performed by: INTERNAL MEDICINE

## 2021-02-18 PROCEDURE — 3008F BODY MASS INDEX DOCD: CPT | Performed by: INTERNAL MEDICINE

## 2021-02-18 PROCEDURE — 3079F DIAST BP 80-89 MM HG: CPT | Performed by: INTERNAL MEDICINE

## 2021-02-18 NOTE — PATIENT INSTRUCTIONS
-Perform light-moderate aerobic exercise, 30-45 minutes daily on most days of the week  -Limit simple carbohydrates/sugars and saturated fats, and limit overall calories to achieve and maintain ideal weight  -In 4 months visit with [facility] to recheck blo

## 2021-02-18 NOTE — H&P
[address] [facility]    Cardiac Electrophysiology Consultation  2021    Name:  Kim Jaffe  : 6/3/1955    Date of consultation:   2021    Referring physician: Karrie Small DO     Reason for Consultation:  Abnormal echocardiogram    Histo Patient Position: Sitting, Cuff Size: large)   Pulse 60   Ht 5' 3\" (1.6 m)   Wt 188 lb (85.3 kg)   BMI 33.30 kg/m²   General: Comfortable, well-nourished, in no acute distress   HEENT: Atraumatic. No scleral icterus.   Mucous membranes are moist.  Neck: valve disease unspecified  (primary encounter diagnosis)  Mitral valve insufficiency, unspecified etiology  Tricuspid valve insufficiency, unspecified etiology  Elevated blood-pressure reading without diagnosis of hypertension    The patient is a 72-year-o

## 2021-03-02 NOTE — TELEPHONE ENCOUNTER
The patient stated she discussed with Dr. Heather Hernandez in September with her physical.  It started in her left hip. She went to see Physical Therapy [facility] and was better. She walked on Sunday and now the pain in her knee and calf is worse.       Action R Statement Selected

## 2021-03-13 DIAGNOSIS — Z23 NEED FOR VACCINATION: ICD-10-CM

## (undated) NOTE — LETTER
[facility] CARDIOLOGY  [address] [facility] [address]  [phone]    2/18/2021        Julia Hall DO  [address]           Patient: Viv Bob   YOB: 1955   Date of Visit: 2/18

## (undated) NOTE — ED AVS SNAPSHOT
[facility] Emergency Department    [address]     [address] [place] [address]    Phone:  [phone]    Fax:  [phone]   MRN: [id number]    Department:  [facility] Emergency Department   Date of Visit:  5/ and Class Registration line at [phone] or find a doctor online by visiting [email or web address].    IF THERE IS ANY CHANGE OR WORSENING OF YOUR CONDITION, CALL YOUR PRIMARY CARE PHYSICIAN AT ONCE OR RETURN IMMEDIATELY TO [address] [place] [address].     If

## (undated) NOTE — LETTER
12/29/20        [address]      Dear Efra Kate records indicate that you have outstanding lab work and or testing that was ordered for you and has not yet been completed:  Orders Placed This Encounter      Ma

## (undated) NOTE — ED AVS SNAPSHOT
[facility] Emergency Department    [address]     [address] [place] [address]    Phone:  [phone]    Fax:  [phone]   MRN: [id number]    Department:  [facility] Emergency Department   Date of Visit:  5/ service to you, we would appreciate any positive or negative feedback related to the care you received in our emergency department. Please call our [address] [facility] at [phone]. Your Emergency Department team is here to serve you.   You are our top priori I certified that I have received a copy of the aftercare instructions; that these instructions have been explained to me; all questions pertaining to these instructions have been answered in a satisfactory manner.         [address] discharge instructions in [employer] by going to Visits < Admission Summaries. If you've been to the Emergency Department or your doctor's office, you can view your past visit information in [employer] by going to Visits < Visit Summaries. [employer] questions?

## (undated) NOTE — MR AVS SNAPSHOT
[address]  [address] [facility]  [phone]               Thank you for choosing us for your health care visit with Louise Mittal DO.   We are glad to serve you and happy to provide you with this summary responsibility. If you have questions, please call your plans customer service number located on your ID card. To schedule Physical Therapy at any of the [facility] facilities, please call [phone].     [facility] view more details from this visit by going to [email or web address]. If you've recently had a stay at the Hospital you can access your discharge instructions in [employer] by going to Visits < Admission Summaries.  If you've been to the Emergency Depar priority on exercise in your life                    Visit [facility] online at  [email or web address]

## (undated) NOTE — MR AVS SNAPSHOT
After Visit Summary   1/11/2021    Dorothea Retana    MRN: [id number]           Visit Information     Date & Time  1/11/2021  1:20 PM Provider  Addison Siu MD [address] [place] [address], [address] [place] [address],[address], [employer]  Phone  3 If you receive a survey from [employer], please take a few minutes to complete it and provide feedback. We strive to deliver the best patient experience and are looking for ways to make improvements. Your feedback will help us do so.  For more information EMERGENCY ROOM Life-threatening emergencies needing immediate intervention at a hospital emergency room.  Average cost  $2,300*   *Cost varies based on your insurance coverage  For more information about hours, locations or appointment options available at